# Patient Record
Sex: FEMALE | Race: WHITE | NOT HISPANIC OR LATINO | Employment: OTHER | URBAN - METROPOLITAN AREA
[De-identification: names, ages, dates, MRNs, and addresses within clinical notes are randomized per-mention and may not be internally consistent; named-entity substitution may affect disease eponyms.]

---

## 2018-02-20 ENCOUNTER — HOSPITAL ENCOUNTER (OUTPATIENT)
Dept: RADIOLOGY | Facility: MEDICAL CENTER | Age: 73
End: 2018-02-20

## 2018-06-26 ENCOUNTER — OFFICE VISIT (OUTPATIENT)
Dept: PHYSICAL MEDICINE AND REHAB | Facility: MEDICAL CENTER | Age: 73
End: 2018-06-26
Payer: MEDICARE

## 2018-06-26 VITALS
SYSTOLIC BLOOD PRESSURE: 124 MMHG | HEART RATE: 74 BPM | OXYGEN SATURATION: 94 % | DIASTOLIC BLOOD PRESSURE: 68 MMHG | WEIGHT: 109 LBS | HEIGHT: 64 IN | TEMPERATURE: 98.2 F | BODY MASS INDEX: 18.61 KG/M2

## 2018-06-26 DIAGNOSIS — M62.838 MUSCLE SPASM: ICD-10-CM

## 2018-06-26 DIAGNOSIS — G89.29 OTHER CHRONIC PAIN: ICD-10-CM

## 2018-06-26 DIAGNOSIS — M85.80 OSTEOPENIA, UNSPECIFIED LOCATION: ICD-10-CM

## 2018-06-26 DIAGNOSIS — Z98.1 S/P LUMBAR FUSION: ICD-10-CM

## 2018-06-26 PROCEDURE — 99205 OFFICE O/P NEW HI 60 MIN: CPT | Performed by: PHYSICAL MEDICINE & REHABILITATION

## 2018-06-26 RX ORDER — DULOXETIN HYDROCHLORIDE 60 MG/1
60 CAPSULE, DELAYED RELEASE ORAL DAILY
Qty: 30 CAP | Refills: 1 | Status: SHIPPED | OUTPATIENT
Start: 2018-06-26 | End: 2018-07-26

## 2018-06-26 RX ORDER — HYDROCODONE BITARTRATE AND ACETAMINOPHEN 5; 325 MG/1; MG/1
TABLET ORAL
COMMUNITY
Start: 2018-06-12 | End: 2018-08-01

## 2018-06-26 RX ORDER — PAROXETINE HYDROCHLORIDE 20 MG/1
TABLET, FILM COATED ORAL
COMMUNITY
Start: 2018-06-12 | End: 2018-06-26

## 2018-06-26 RX ORDER — DIAZEPAM 5 MG/1
2.5 TABLET ORAL
COMMUNITY
End: 2018-06-26

## 2018-06-26 RX ORDER — HYDROCODONE BITARTRATE AND ACETAMINOPHEN 10; 325 MG/1; MG/1
1 TABLET ORAL
COMMUNITY
End: 2018-06-26

## 2018-06-26 RX ORDER — HYDROCODONE BITARTRATE AND ACETAMINOPHEN 10; 325 MG/1; MG/1
1 TABLET ORAL
COMMUNITY
End: 2018-07-24

## 2018-06-26 RX ORDER — IBUPROFEN 200 MG
200 TABLET ORAL
COMMUNITY
End: 2018-06-26

## 2018-06-26 RX ORDER — CELECOXIB 200 MG/1
CAPSULE ORAL
COMMUNITY
Start: 2018-06-12 | End: 2018-07-24

## 2018-06-26 RX ORDER — DIAZEPAM 5 MG/1
TABLET ORAL
COMMUNITY
Start: 2016-04-05 | End: 2018-07-24

## 2018-06-26 RX ORDER — DULOXETIN HYDROCHLORIDE 30 MG/1
30 CAPSULE, DELAYED RELEASE ORAL DAILY
Qty: 7 CAP | Refills: 0 | Status: SHIPPED | OUTPATIENT
Start: 2018-06-26 | End: 2018-07-03

## 2018-06-26 RX ORDER — TAMSULOSIN HYDROCHLORIDE 0.4 MG/1
0.4 CAPSULE ORAL
COMMUNITY
Start: 2016-06-14 | End: 2018-06-26

## 2018-06-26 RX ORDER — DIAZEPAM 5 MG/1
TABLET ORAL
Refills: 0 | COMMUNITY
Start: 2018-04-04 | End: 2018-06-26

## 2018-06-26 RX ORDER — CELECOXIB 200 MG/1
CAPSULE ORAL
COMMUNITY
Start: 2018-05-18 | End: 2018-06-26

## 2018-06-26 RX ORDER — SENNOSIDES 8.6 MG
2 TABLET ORAL
COMMUNITY

## 2018-06-26 RX ORDER — DICLOFENAC SODIUM 75 MG/1
TABLET, DELAYED RELEASE ORAL
COMMUNITY
Start: 2018-06-12 | End: 2018-07-24

## 2018-06-26 ASSESSMENT — PAIN SCALES - GENERAL: PAINLEVEL: 7=MODERATE-SEVERE PAIN

## 2018-06-26 NOTE — PROGRESS NOTES
New patient note    Physiatry (physical medicine and  Rehabilitation), interventional spine and sports medicine    Date of Service: 6/26/2018    Chief complaint: Low back pain    HISTORY    HPI: Rupal Amaya 73 y.o. female who presents today for evaluation of low back pain that is sharp in the morning.  She has had three lumbar spine surgeries with end result of lumbar fusion from L2-S1.  She reports that she is able to to ride her bicycle and does this for about 30 minutes a day.  This is okay, but when she walks, she can only go a few hundred yards and this is pretty limiting given the previous active lifestyle that she was accustomed to leading.  Within the last few months, she had an epidural performed by Dr. Barker.  This was done in the office.    She does not report significant weakness, but she more the significant increase in pain with walking.  Her surgeries and condition have not been without complications with the surgery in 2014 .  About three years ago, she had cauda equina syndrome following resection of benign epidural soft tissue tumor.  She does not report issues with her bowel or bladder function currently.      Medical records review:  I reviewed the note from the referring provider Dr. Héctor Powers 06/12/2018  She takes norco for acute worsening of pain, typically is taking about 3-4 tablets in a week.  Dr. Powers reported that she had an epidural with Dr. Barker.  During her visit May 2018, she had suggested that she would like to stop her Paxil.  At the 06/12/2018 visit, they elected to restart this medication.    At her most recent evaluation with a [facility] 03/06/2018, they had suggested a trial of physical therapy.  If this was not successful, they had discussed possible L1-2 laminectomy and fusion for the severe spinal stenosis at the level above her most proximally fused level.      Previous treatments:    Physical Therapy: Yes     Medications the patient is tried: Norco,  Celebrex    Previous surgeries: Multiple surgeries with the most recent being 12/5/2017, an anterior lateral lumbar fusion with instrumentation and BMP with Dr. Ennis.    ROS:   Red Flags ROS:   Fever, Chills, Sweats: Denies  Involuntary Weight Loss: Denies  Bladder Incontinence: Denies  Bowel Incontinence: denies  Saddle Anesthesia: Denies    All other systems reviewed and negative.       PMHx:   History reviewed. No pertinent past medical history.    PSHx:   Past Surgical History:   Procedure Laterality Date   • LUMBAR FUSION ANTERIOR  2017    Dr. Tawana Ennis   • LUMBAR LAMINECTOMY DISKECTOMY  12/27/2014    Performed by Benjamin Carney M.D. at SURGERY [facility]   • LUMBAR FUSION POSTERIOR  12/27/2014    Performed by Benjamin Carney M.D. at SURGERY [facility]   • HARDWARE REMOVAL NEURO  12/27/2014    Performed by Benjamin Carney M.D. at SURGERY [facility]   • LUMBAR FUSION ANTERIOR  2006    Dr. Tawana Ennis   • LUMBAR FUSION POSTERIOR  2006    L4-5 Dr. Tawana Ennis       Family history   History reviewed. No pertinent family history.      Medications:   Current Outpatient Prescriptions   Medication   • celecoxib (CELEBREX) 200 MG Cap   • diazePAM (VALIUM) 5 MG Tab   • diclofenac EC (VOLTAREN) 75 MG Tablet Delayed Response   • HYDROcodone-acetaminophen (NORCO) 5-325 MG Tab per tablet   • Sennosides (SENNA) 8.6 MG Tab   • DULoxetine (CYMBALTA) 30 MG Cap DR Particles   • DULoxetine (CYMBALTA) 60 MG Cap DR Particles delayed-release capsule   • hydrocodone/acetaminophen (NORCO)  MG TABS   • HYDROcodone/acetaminophen (NORCO)  MG Tab     No current facility-administered medications for this visit.        Allergies:   No Known Allergies    Social Hx:   Social History     Social History   • Marital status:      Spouse name: N/A   • Number of children: N/A   • Years of education: N/A     Occupational History   • Not on file.     Social History Main Topics   • Smoking status: Never Smoker   • Smokeless  "tobacco: Never Used   • Alcohol use 1.2 oz/week     2 Glasses of wine per week      Comment: every night   • Drug use: No   • Sexual activity: Not on file     Other Topics Concern   •  Service No   • Blood Transfusions No   • Caffeine Concern No   • Occupational Exposure No   • Hobby Hazards Yes   • Sleep Concern No   • Stress Concern No   • Special Diet No   • Back Care No   • Exercise Yes   • Bike Helmet Yes   • Seat Belt Yes   • Self-Exams No     Social History Narrative   • No narrative on file         EXAMINATION     Physical Exam:   Vitals: Blood pressure 124/68, pulse 74, temperature 36.8 °C (98.2 °F), height 1.626 m (5' 4\"), weight 49.4 kg (109 lb), SpO2 94 %.    Constitutional:   Body Habitus: Body mass index is 18.71 kg/m².  Cooperation: Fully cooperates with exam  Appearance: Well-groomed, well-nourished, not disheveled, in no acute distress    Eyes: No scleral icterus to suggest severe liver disease, no proptosis to suggest severe hyperthyroid    ENT -no obvious auditory deficits, no facial droop     Skin -no rashes or lesions noted     Respiratory-  breathing comfortable on room air, no audible wheezing    Cardiovascular- capillary refills less than 2 seconds. No lower extremity edema is noted.     Gastrointestinal - no obvious abdominal masses, No tenderness to palpation in the abdomen    Psychiatric- alert and oriented ×3. Normal affect.     Gait - mildly antalgic gait, no use of ambulatory device.  Heel and toe walking are difficult, but she is able to perform     Musculoskeletal -   Cervical spine   Inspection: No deformities of the skin over the cervical spine. No rashes or lesions.    Functional A/P ROM in all directions, without  pain      Spurling’s sign: negative bilaterally    No signs of muscular atrophy in bilateral upper extremities     Thoracic/Lumbar Spine/Sacral Spine/Hips   Inspection: No obvious evidence of atrophy in bilateral lower extremities throughout     ROM: decreased " AROM with flexion, extension, lateral flexion, and rotation bilaterally, with pain     Palpation:   No tenderness to palpation in midline at T1-T12 levels. Tenderness to palpation in the left and right of the midline T1-L5.  There is note of muscle spasms bilaterally in the lumbar spine.  palpation over SI joint: mild PSIS tenderness bilaterally    palpation over buttock: negative bilaterally    palpation in hip or over the greater trochanters: negative bilaterally    Quadriceps contractures bilaterally    Lumbar spine Special tests  Neuro tension  Straight leg test negative bilaterally    Femoral stretch sign results in thigh discomfort.    HIP  Negative FAIR test bilaterally. Internal rotation and external rotation are symmetric.    SI joint tests  Observation patient sits on one buttocks: Negative   RICHARD test negative bilaterally       Neuro       Key points for the international standards for neurological classification of spinal cord injury (ISNCSCI) to light touch.     Dermatome R L   C4 2  2   C5 2 2   C6 1 2   C7 1 2   C8 2 2   T1 2 2   T2 2 2   L2 2 2   L3 2 2   L4 2 2   L5 2 2   S1 2 2   S2 2 2         Motor Exam Upper Extremities   ? Myotome R L   Shoulder flexion C5 5 5   Elbow flexion C5 5 5   Wrist extension C6 5 5   Elbow extension C7 5 5   Finger flexion C8 5 5   Finger abduction T1 5 5         Motor Exam Lower Extremities    ? Myotome R L   Hip flexion L2 5 5   Knee extension L3 5 5   Ankle dorsiflexion L4 5 5   Toe extension L5 5 5   Ankle plantarflexion S1 5 5           Noel’s sign negative bilaterally   Babinski sign negative bilaterally   Clonus of the ankle negative bilaterally     Reflexes  ?  R L   Biceps  2+  2+   Brachioradialis  2+ 2+   Patella  2+ 2+   Achilles   2+ 2+         MEDICAL DECISION MAKING    Medical records review: see under HPI section.     DATA    Labs:   Lab Results   Component Value Date/Time    SODIUM 136 01/01/2015 06:23 AM    POTASSIUM 3.6 01/01/2015 06:23 AM     CHLORIDE 106 01/01/2015 06:23 AM    CO2 24 01/01/2015 06:23 AM    ANION 6.0 01/01/2015 06:23 AM    GLUCOSE 102 (H) 01/01/2015 06:23 AM    BUN 8 01/01/2015 06:23 AM    CREATININE 0.50 01/01/2015 06:23 AM    CALCIUM 8.5 01/01/2015 06:23 AM    ASTSGOT 29 01/01/2015 06:23 AM    ALTSGPT 21 01/01/2015 06:23 AM    TBILIRUBIN 0.5 01/01/2015 06:23 AM    ALBUMIN 3.2 01/01/2015 06:23 AM    TOTPROTEIN 5.8 (L) 01/01/2015 06:23 AM    GLOBULIN 2.6 01/01/2015 06:23 AM    AGRATIO 1.2 01/01/2015 06:23 AM       Lab Results   Component Value Date/Time    PROTHROMBTM 13.3 12/27/2014 12:30 AM    INR 1.02 12/27/2014 12:30 AM           Imaging: I personally reviewed following images, these are my reads  MRI lumbar spine 02/13/2018: Reviewed with the patient at the time of the visit.  There is evidence of lumbar fusion from L2-5. Prosthetic disc at L4-5.  There is some scoliosis noted.  There is artifact.    IMAGING radiology reads. I reviewed the following radiology reads     DEXA 2004: Osteopenia                                         Results for orders placed during the hospital encounter of 12/26/14   MR-LUMBAR SPINE-W/O    Impression 1.  Persistent soft tissue structure within spinal canal from L5-S2 measuring approximately 4 x 2.2 x 1.0 cm. Considerations include a disc fragment, chordoma, or possible additional etiology. Contrast exam may prove useful for further differentiation.  2.  Posterior spinal fusion with pedicle screws at L4 and L5 with vertical rods. Prosthetic disc at L4-5 level.  3.  Posterior disc protrusions and hypertrophic ridging as well as ligamentous buckling and facet arthropathy contributing to spinal canal stenosis and foraminal stenosis at multiple levels.  4.  Overall findings grossly stable since the previous study performed 12/19/14.                                                                                                                Diagnosis   Visit Diagnoses     ICD-10-CM   1. S/P lumbar fusion  Z98.1   2. Other chronic pain G89.29   3. Muscle spasm M62.838   4. Osteopenia, unspecified location M85.80       ASSESSMENT:  Rupal Amaya 73 y.o. female who presents for evaluation of symptoms in her low back     Rupal was seen today for new patient.    Diagnoses and all orders for this visit:    S/P lumbar fusion  -     DULoxetine (CYMBALTA) 30 MG Cap DR Particles; Take 1 Cap by mouth every day for 7 days.  -     DULoxetine (CYMBALTA) 60 MG Cap DR Particles delayed-release capsule; Take 1 Cap by mouth every day for 30 days.    Other chronic pain  -     DULoxetine (CYMBALTA) 30 MG Cap DR Particles; Take 1 Cap by mouth every day for 7 days.  -     DULoxetine (CYMBALTA) 60 MG Cap DR Particles delayed-release capsule; Take 1 Cap by mouth every day for 30 days.    Muscle spasm  -     TENS UNIT    Osteopenia, unspecified location  -     VITAMIN D,25 HYDROXY; Future      Given her lumbar muscle spasms, trial of a TENS unit might provide relief and is worth a trial.    She is using opioids, but is not taking high doses or even daily.  This seems like a reasonable strategy to use the minimal effective amount to supplement her usual treatment.    We talked about a trial of duloxetine.  This would replace paxil, which she had just resumed.  We discussed that this could help with her chronic musculoskeletal pain and hopefully stiff help with mood.      Check vitamin D to follow-up on osteopenia noted on xray.  We discussed importance of bone health.    We discussed the extensive surgical treatments that she has had as well as the lumbar spinal stenosis at L1-2, that appears severe.  Given her extensive surgical treatment, injections under fluoroscopic guidance could be considered, but given anatomical changes, it is difficult to project what kind of response she might have.  We could also consider lumbar facet injections, however, as this might offer some relief given innervation patterns of the medial  branches.    Outside records requested:  The patient signed outside records request form for her outside records including outside images.      Follow-up:4-6 weeks        Please note that this dictation was created using voice recognition software. I have made every reasonable attempt to correct obvious errors but there may be errors of grammar and content that I may have overlooked prior to finalization of this note.      Joshua Navarro MD  Physical Medicine and Rehabilitation  Interventional Spine and Sports Physiatry  [facility]

## 2018-07-02 ENCOUNTER — HOSPITAL ENCOUNTER (OUTPATIENT)
Dept: RADIOLOGY | Facility: MEDICAL CENTER | Age: 73
End: 2018-07-02

## 2018-07-03 ENCOUNTER — HOSPITAL ENCOUNTER (OUTPATIENT)
Dept: RADIOLOGY | Facility: MEDICAL CENTER | Age: 73
End: 2018-07-03

## 2018-07-24 ENCOUNTER — OFFICE VISIT (OUTPATIENT)
Dept: PHYSICAL MEDICINE AND REHAB | Facility: MEDICAL CENTER | Age: 73
End: 2018-07-24
Payer: MEDICARE

## 2018-07-24 VITALS
DIASTOLIC BLOOD PRESSURE: 65 MMHG | HEIGHT: 65 IN | TEMPERATURE: 97.8 F | OXYGEN SATURATION: 92 % | WEIGHT: 108.25 LBS | BODY MASS INDEX: 18.03 KG/M2 | HEART RATE: 87 BPM | SYSTOLIC BLOOD PRESSURE: 110 MMHG

## 2018-07-24 DIAGNOSIS — M79.2 NEUROPATHIC PAIN: ICD-10-CM

## 2018-07-24 DIAGNOSIS — M62.838 MUSCLE SPASM: ICD-10-CM

## 2018-07-24 DIAGNOSIS — M47.816 LUMBAR SPONDYLOSIS: ICD-10-CM

## 2018-07-24 DIAGNOSIS — F11.90 CHRONIC, CONTINUOUS USE OF OPIOIDS: ICD-10-CM

## 2018-07-24 DIAGNOSIS — M54.5 BILATERAL LOW BACK PAIN, UNSPECIFIED CHRONICITY, WITH SCIATICA PRESENCE UNSPECIFIED: ICD-10-CM

## 2018-07-24 DIAGNOSIS — Z98.1 S/P LUMBAR FUSION: ICD-10-CM

## 2018-07-24 PROCEDURE — 99214 OFFICE O/P EST MOD 30 MIN: CPT | Performed by: PHYSICAL MEDICINE & REHABILITATION

## 2018-07-24 RX ORDER — GABAPENTIN 300 MG/1
300 CAPSULE ORAL 4 TIMES DAILY
Qty: 120 CAP | Refills: 1 | Status: SHIPPED | OUTPATIENT
Start: 2018-07-24 | End: 2018-08-01

## 2018-07-24 ASSESSMENT — PAIN SCALES - GENERAL: PAINLEVEL: 8=MODERATE-SEVERE PAIN

## 2018-07-24 NOTE — PATIENT INSTRUCTIONS
Start magnesium oxide 400mg at night.  Look for GMP or USP symbol     Get vitamin D and CMP done    Gabapentin  Start 300mg at bedtime for 3-5 days, then increase to 300mg at dinner and bedtime x3-5 days, then 300mg at lunch, dinner and bedtime x3-5d, then 300mg with breakfast, lunch, dinner and bedtime     If symptoms are significantly improved at lower dose, hold at that dose.  Call with questions.

## 2018-07-24 NOTE — PROGRESS NOTES
"Follow up patient note  Pain Medicine, Interventional spine and sports physiatry, Physical medicine rehabilitation      Chief complaint:   Low back and leg pain      HISTORY    Please see new patient note dated by Dr. Navarro,  for more details.     HPI  Patient identification: Rupal Amaya 73 y.o. female returns for follow-up of her low back pain.    Interval history: Rupal Amaya 73 y.o. female who presents today for evaluation of low back pain that is sharp in the morning.  She has had three lumbar spine surgeries with end result of lumbar fusion from L2-S1.  The pain is \"serious\" and is about an 8/10 and is impacting her life and mobility.  In describing the quality, her pain is \"burning\" in the low back and then aching.    Since the last visit, she continues to ride her bike for about 30 minutes a day. Currently, she is taking cymbalta.  This does seem to help somewhat.  No side effects.      TENS unit is somewhat helpful, while she is wearing it.    Right now, she is taking norco 5/325 in the morning.  This also helps a little bit, but does not provide relief through the day.    She is not currently taking anti-inflammatories.       ROS Red Flags :   Fever, Chills, Sweats: Denies  Involuntary Weight Loss: Denies  Bowel/Bladder Incontinence: Denies changes, she does have a history of cauda equina  Saddle Anesthesia: Denies      PMHx:   History reviewed. No pertinent past medical history.    PSHx:    Past Surgical History:   Procedure Laterality Date   • LUMBAR FUSION ANTERIOR  2017    Dr. Tawana Ennis   • LUMBAR LAMINECTOMY DISKECTOMY  12/27/2014    Performed by Benjamin Carney M.D. at SURGERY [facility]   • LUMBAR FUSION POSTERIOR  12/27/2014    Performed by Benjamin Carney M.D. at SURGERY [facility]   • HARDWARE REMOVAL NEURO  12/27/2014    Performed by Benjamin Carney M.D. at SURGERY [facility]   • LUMBAR FUSION ANTERIOR  2006    Dr. Tawana Ennis   • LUMBAR FUSION POSTERIOR  2006    L4-5 Dr." "Tawana Ennis       Family history    Denies neuromuscular disease  History reviewed. No pertinent family history.      Medications:    Current Outpatient Prescriptions   Medication   • magnesium oxide (MAG-OX) 400 (241.3 Mg) MG Tab tablet   • gabapentin (NEURONTIN) 300 MG Cap   • HYDROcodone-acetaminophen (NORCO) 5-325 MG Tab per tablet   • Sennosides (SENNA) 8.6 MG Tab   • DULoxetine (CYMBALTA) 60 MG Cap DR Particles delayed-release capsule     No current facility-administered medications for this visit.        Allergies:    No Known Allergies    Social Hx:    Social History     Social History   • Marital status:      Spouse name: N/A   • Number of children: N/A   • Years of education: N/A     Occupational History   • Not on file.     Social History Main Topics   • Smoking status: Never Smoker   • Smokeless tobacco: Never Used   • Alcohol use 1.2 oz/week     2 Glasses of wine per week      Comment: every night   • Drug use: No   • Sexual activity: Not on file     Other Topics Concern   •  Service No   • Blood Transfusions No   • Caffeine Concern No   • Occupational Exposure No   • Hobby Hazards Yes   • Sleep Concern No   • Stress Concern No   • Special Diet No   • Back Care No   • Exercise Yes   • Bike Helmet Yes   • Seat Belt Yes   • Self-Exams No     Social History Narrative   • No narrative on file         EXAMINATION     Physical Exam:   Vitals: Blood pressure 110/65, pulse 87, temperature 36.6 °C (97.8 °F), height 1.651 m (5' 5\"), weight 49.1 kg (108 lb 3.9 oz), SpO2 92 %.    Constitutional:   Body Habitus: Body mass index is 18.01 kg/m².  Cooperation: Fully cooperates with exam  Appearance: Well-groomed no disheveled, in no acute distress    Respiratory-  breathing comfortable on room air, no audible wheezing  Cardiovascular- capillary refills less than 2 seconds. No lower extremity edema is noted.   Psychiatric- alert and oriented ×3. Normal affect.   Spine:   Tenderness to palpation left and " right midline of L1-L5.   Seated SLR is negative bilaterally  Sensation is grossly intact in the lower extremities bilaterally  No focal motor deficits were noted in the lower extremities bilaterally  Reflexes are 2+ bilateral patella and achilles bilaterally        MEDICAL DECISION MAKING    DATA    Labs:  Lab Results   Component Value Date/Time    SODIUM 136 01/01/2015 06:23 AM    POTASSIUM 3.6 01/01/2015 06:23 AM    CHLORIDE 106 01/01/2015 06:23 AM    CO2 24 01/01/2015 06:23 AM    GLUCOSE 102 (H) 01/01/2015 06:23 AM    BUN 8 01/01/2015 06:23 AM    CREATININE 0.50 01/01/2015 06:23 AM        Lab Results   Component Value Date/Time    PROTHROMBTM 13.3 12/27/2014 12:30 AM    INR 1.02 12/27/2014 12:30 AM        Lab Results   Component Value Date/Time    WBC 8.1 01/01/2015 06:23 AM    RBC 3.07 (L) 01/01/2015 06:23 AM    HEMOGLOBIN 10.2 (L) 01/01/2015 06:23 AM    HEMATOCRIT 29.5 (L) 01/01/2015 06:23 AM    MCV 96.1 01/01/2015 06:23 AM    MCH 33.2 (H) 01/01/2015 06:23 AM    MCHC 34.6 01/01/2015 06:23 AM    MPV 9.6 01/01/2015 06:23 AM    NEUTSPOLYS 68.3 01/01/2015 06:23 AM    LYMPHOCYTES 17.8 (L) 01/01/2015 06:23 AM    MONOCYTES 8.9 01/01/2015 06:23 AM    EOSINOPHILS 4.2 01/01/2015 06:23 AM    BASOPHILS 0.4 01/01/2015 06:23 AM          Imaging: I personally reviewed images at previous visit.  MRI lumbar spine 02/13/2018: Reviewed with the patient at the time of the visit.  There is evidence of lumbar fusion from L2-5. Prosthetic disc at L4-5.  There is some scoliosis noted.  There is artifact.  I reviewed the following radiology reports                                                   DIAGNOSIS   Visit Diagnoses     ICD-10-CM   1. Lumbar spondylosis M47.816   2. Bilateral low back pain, unspecified chronicity, with sciatica presence unspecified M54.5   3. S/P lumbar fusion Z98.1   4. Chronic, continuous use of opioids F11.90   5. Muscle spasm M62.838   6. Neuropathic pain M79.2         ASSESSMENT and PLAN:     Rupal Bennett  Monique 73 y.o. female with chronic pain s/p lumbbar fusion from L2-5 with prosthetic disc at L4-5    Rupal was seen today for follow-up.    Diagnoses and all orders for this visit:    Lumbar spondylosis  -     COMP METABOLIC PANEL  -     REFERRAL TO PHYSIATRY (PMR)    Bilateral low back pain, unspecified chronicity, with sciatica presence unspecified  -     magnesium oxide (MAG-OX) 400 (241.3 Mg) MG Tab tablet; Take 1 Tab by mouth every day for 30 days.  -     REFERRAL TO PHYSIATRY (PMR)    S/P lumbar fusion  -     PAIN MANAGEMENT SCRN, UR  -     gabapentin (NEURONTIN) 300 MG Cap; Take 1 Cap by mouth 4 times a day for 30 days.    Chronic, continuous use of opioids  -     PAIN MANAGEMENT SCRN, UR    Muscle spasm  -     magnesium oxide (MAG-OX) 400 (241.3 Mg) MG Tab tablet; Take 1 Tab by mouth every day for 30 days.    Neuropathic pain  -     gabapentin (NEURONTIN) 300 MG Cap; Take 1 Cap by mouth 4 times a day for 30 days.      Discussed trial of gabapentin for features of neuropathic pain.  Discussed titrating this medication slowly.    Trial magnesium at bedtime.    UDS obtained to for consideration of management of chronic opioids.  She has had several surgeries and is not interested in more spine surgery.    Continue activity as tolerated.    Trial of lumbar facet blocks discussed.  We will trial L2-L5 bilaterally.  Risks, benefits and expectations discussed.  We will plan for a diagnostic and therapeutic L2-5 medial branch block trial.      Follow up: 2 weeks after the procedure    Thank you for allowing me to participate in the care of this patient. If you have any questions please not hesitate to contact me.    Total time: 30 minutes face-to-face time. I spent greater than 50% of the time for patient care and coordination on this date, including with the patient as per assessment and plan above.       Please note that this dictation was created using voice recognition software. I have made every reasonable  attempt to correct obvious errors but there may be errors of grammar and content that I may have overlooked prior to finalization of this note.      Joshua Navarro MD  Interventional Spine and Sports Physiatry  Physical Medicine and Rehabilitation  [facility]  7/24/2018 9:10 AM

## 2018-08-01 ENCOUNTER — HOSPITAL ENCOUNTER (OUTPATIENT)
Dept: PAIN MANAGEMENT | Facility: REHABILITATION | Age: 73
End: 2018-08-01
Attending: PHYSICAL MEDICINE & REHABILITATION
Payer: MEDICARE

## 2018-08-01 ENCOUNTER — HOSPITAL ENCOUNTER (OUTPATIENT)
Dept: RADIOLOGY | Facility: REHABILITATION | Age: 73
End: 2018-08-01
Attending: PHYSICAL MEDICINE & REHABILITATION
Payer: MEDICARE

## 2018-08-01 VITALS
SYSTOLIC BLOOD PRESSURE: 145 MMHG | HEIGHT: 65 IN | TEMPERATURE: 98.4 F | BODY MASS INDEX: 17.81 KG/M2 | WEIGHT: 106.92 LBS | HEART RATE: 69 BPM | DIASTOLIC BLOOD PRESSURE: 78 MMHG | RESPIRATION RATE: 16 BRPM | OXYGEN SATURATION: 97 %

## 2018-08-01 PROCEDURE — 64495 INJ PARAVERT F JNT L/S 3 LEV: CPT

## 2018-08-01 PROCEDURE — 700117 HCHG RX CONTRAST REV CODE 255

## 2018-08-01 PROCEDURE — 700111 HCHG RX REV CODE 636 W/ 250 OVERRIDE (IP)

## 2018-08-01 PROCEDURE — 64494 INJ PARAVERT F JNT L/S 2 LEV: CPT

## 2018-08-01 PROCEDURE — 700101 HCHG RX REV CODE 250

## 2018-08-01 PROCEDURE — 64493 INJ PARAVERT F JNT L/S 1 LEV: CPT

## 2018-08-01 RX ORDER — METHYLPREDNISOLONE ACETATE 40 MG/ML
INJECTION, SUSPENSION INTRA-ARTICULAR; INTRALESIONAL; INTRAMUSCULAR; SOFT TISSUE
Status: COMPLETED
Start: 2018-08-01 | End: 2018-08-01

## 2018-08-01 RX ORDER — LIDOCAINE HYDROCHLORIDE 10 MG/ML
INJECTION, SOLUTION EPIDURAL; INFILTRATION; INTRACAUDAL; PERINEURAL
Status: COMPLETED
Start: 2018-08-01 | End: 2018-08-01

## 2018-08-01 RX ORDER — BUPIVACAINE HYDROCHLORIDE 5 MG/ML
INJECTION, SOLUTION EPIDURAL; INTRACAUDAL
Status: COMPLETED
Start: 2018-08-01 | End: 2018-08-01

## 2018-08-01 RX ORDER — LIDOCAINE HYDROCHLORIDE 20 MG/ML
INJECTION, SOLUTION EPIDURAL; INFILTRATION; INTRACAUDAL; PERINEURAL
Status: COMPLETED
Start: 2018-08-01 | End: 2018-08-01

## 2018-08-01 RX ADMIN — METHYLPREDNISOLONE ACETATE 80 MG: 40 INJECTION, SUSPENSION INTRA-ARTICULAR; INTRALESIONAL; INTRAMUSCULAR; SOFT TISSUE at 08:11

## 2018-08-01 RX ADMIN — LIDOCAINE HYDROCHLORIDE 6 ML: 20 INJECTION, SOLUTION EPIDURAL; INFILTRATION; INTRACAUDAL; PERINEURAL at 08:11

## 2018-08-01 RX ADMIN — IOHEXOL 3 ML: 240 INJECTION, SOLUTION INTRATHECAL; INTRAVASCULAR; INTRAVENOUS; ORAL at 08:10

## 2018-08-01 ASSESSMENT — PAIN SCALES - GENERAL
PAINLEVEL_OUTOF10: 6
PAINLEVEL_OUTOF10: 3
PAINLEVEL_OUTOF10: 3

## 2018-08-01 NOTE — PROGRESS NOTES
Medication reconciliation reviewed with patient. Denied taking any blood thinners and any  any anti- inflammatories medications. Patient had a   Ritika / spouse .Home care form and verbal  instruction given to patient and verbalized understanding. Dr. Navarro made aware & spoke to the patient . Hand off reported to DICK Hodge RN.

## 2018-08-01 NOTE — PROGRESS NOTES
.Fluids tolerated well. Ice compress  offered patient refused Reviewed home care instructions and understood by patient.

## 2018-08-01 NOTE — PROCEDURES
Date of Service: 8/1/2018    Physician/s:Joshua Navarro MD    Pre-operative Diagnosis: Lumbosacral spondylosis, bilateral low back pain without sciatica    Post-operative Diagnosis: Lumbosacral spondylosis, bilateral low back pain without sciatica    Procedure: Medial Branch Blocks targeting the right and left lumbar two through lumbar five medial branch blocks bilaterally     Description of procedure:    The risks, benefits, and alternatives of the procedure were reviewed and discussed with the patient.  Written informed consent was freely obtained. A pre-procedural time-out was conducted by the physician verifying patient’s identity, procedure to be performed, procedure site and side, and allergy verification. Appropriate equipment was determined to be in place for the procedure.        Right lumbar two through lumbar five medial branch blocks and right lumbar five dorsal ramus blocks:    A 25g 3.5 inch needle was then placed at the intersection of the transverse process and superior articular process at the right lumbar two medial branch location.  The needle tips were then verified by AP and lateral views, less than 1 cc of contrast dye was used to highlight the medial branch while the fluoroscope was running live. Following negative aspiration, approx 1.0cc of solution containing 1.0 cc depomedrol (40mg) and 3.0cc of 2% lidocaine was then injected at the above levels, and the needles were removed intact.    A 25g 3.5 inch needle was then placed at the intersection of the transverse process and superior articular process at the right lumbar three medial branch location.  The needle tips were then verified by AP and lateral views, less than 1 cc of contrast dye was used to highlight the medial branch while the fluoroscope was running live. Following negative aspiration, approx 1.0cc of solution containing 1.0cc depomedrol (40mg) and 3.0cc of 2% lidocaine was then injected at the above levels, and the needles  were removed intact.    A 25g 3.5 inch needle was then placed at the intersection of the transverse process and superior articular process at the right lumbar four medial branch location.  The needle tips were then verified by AP and lateral views, less than 1 cc of contrast dye was used to highlight the medial branch while the fluoroscope was running live. Following negative aspiration, approx 1.0cc of solution containing 1.0cc depomedrol (40mg) and 3.0cc of 2% lidocaine was then injected at the above levels, and the needles were removed intact.    A 25g 3.5 inch needle was then placed at the intersection of the transverse process and superior articular process at the right lumbar five dorsal ramus nerve  The needle tips were then verified by oblique view, less than 1 cc of contrast dye was used to highlight the medial branch while the fluoroscope was running live. Following negative aspiration, approx 1.0cc of solution containing 1.0cc depomedrol (40mg) and 3.0cc of 2% lidocaine was then injected at the above levels, and the needles were removed intact.     Left lumbar two through lumbar five medial branch blocks and left lumbar five dorsal ramus branch blocks:    A 25g 3.5 inch needle was then placed at the intersection of the transverse process and superior articular process at the left lumbar two medial branch location.  The needle tips were then verified by AP and lateral views, less than 1 cc of contrast dye was used to highlight the medial branch while the fluoroscope was running live. Following negative aspiration, approx 1.0cc of solution containing 1.0 cc depomedrol (40mg) and 3.0cc of 2% lidocaine was then injected at the above levels, and the needles were removed intact.    A 25g 3.5 inch needle was then placed at the intersection of the transverse process and superior articular process at the left lumbar three medial branch location.  The needle tips were then verified by AP and lateral views , less  than 1 cc of contrast dye was used to highlight the medial branch while the fluoroscope was running live. Following negative aspiration, approx 1.0cc of solution containing 1.0cc depomedrol (40mg) and 3.0cc of 2% lidocaine was then injected at the above levels, and the needles were removed intact.    A 25g 3.5 inch needle was then placed at the intersection of the transverse process and superior articular process at the left lumbar four medial branch location.  The needle tips were then verified by AP and lateral views, less than 1 cc of contrast dye was used to highlight the medial branch while the fluoroscope was running live. Following negative aspiration, approx 1.0cc of solution containing 1.0cc depomedrol (40mg) and 3.0cc of 2% lidocaine was then injected at the above levels, and the needles were removed intact.    A 25g 3.5 inch needle was then placed at the intersection of the transverse process and superior articular process at the left lumbar five dorsal ramus nerve  The needle tips were then verified by AP and lateral views, less than 1 cc of contrast dye was used to highlight the medial branch while the fluoroscope was running live. Following negative aspiration, approx 1.0cc of solution containing 1.0cc depomedrol (40mg) and 3.0cc of 2% lidocaine was then injected at the above levels, and the needles were removed intact.      The patient's back was covered with a 4x4 gauze, the area was cleansed with sterile normal saline, and a dressing was applied.      There were no complications noted, the patient was hemodynamically stable, and tolerated the procedure well.   She reported decreased pain and we will send her out with a sheet to track symptoms today.  She was discharged in good condition.    Joshua Navarro MD  Physical Medicine and Rehabilitation  Interventional Spine and Sports Physiatry  [facility]  8/1/2018  10:34 AM

## 2018-08-01 NOTE — PROGRESS NOTES
Patient positioned pre-procedure by RN,CNA, and xray tech. Pillow placed under lower legs and feet for support.

## 2018-08-14 ENCOUNTER — OFFICE VISIT (OUTPATIENT)
Dept: PHYSICAL MEDICINE AND REHAB | Facility: MEDICAL CENTER | Age: 73
End: 2018-08-14
Payer: MEDICARE

## 2018-08-14 VITALS
WEIGHT: 107.36 LBS | SYSTOLIC BLOOD PRESSURE: 110 MMHG | DIASTOLIC BLOOD PRESSURE: 70 MMHG | HEART RATE: 86 BPM | TEMPERATURE: 98.8 F | BODY MASS INDEX: 17.87 KG/M2 | OXYGEN SATURATION: 95 %

## 2018-08-14 DIAGNOSIS — M79.2 NEUROPATHIC PAIN: ICD-10-CM

## 2018-08-14 DIAGNOSIS — Z98.1 S/P LUMBAR FUSION: ICD-10-CM

## 2018-08-14 DIAGNOSIS — G89.29 OTHER CHRONIC PAIN: ICD-10-CM

## 2018-08-14 DIAGNOSIS — F11.90 CHRONIC, CONTINUOUS USE OF OPIOIDS: ICD-10-CM

## 2018-08-14 DIAGNOSIS — M54.5 BILATERAL LOW BACK PAIN, UNSPECIFIED CHRONICITY, WITH SCIATICA PRESENCE UNSPECIFIED: ICD-10-CM

## 2018-08-14 PROCEDURE — 99214 OFFICE O/P EST MOD 30 MIN: CPT | Performed by: PHYSICAL MEDICINE & REHABILITATION

## 2018-08-14 RX ORDER — DULOXETIN HYDROCHLORIDE 60 MG/1
CAPSULE, DELAYED RELEASE ORAL
COMMUNITY
Start: 2018-07-31 | End: 2018-08-14 | Stop reason: SDUPTHER

## 2018-08-14 RX ORDER — GABAPENTIN 300 MG/1
300 CAPSULE ORAL 4 TIMES DAILY
Qty: 120 CAP | Refills: 2 | Status: SHIPPED | OUTPATIENT
Start: 2018-08-14 | End: 2018-09-07 | Stop reason: SDUPTHER

## 2018-08-14 RX ORDER — DULOXETIN HYDROCHLORIDE 60 MG/1
60 CAPSULE, DELAYED RELEASE ORAL DAILY
Qty: 30 CAP | Refills: 5 | Status: SHIPPED | OUTPATIENT
Start: 2018-08-14 | End: 2018-09-13

## 2018-08-14 RX ORDER — BUPRENORPHINE 5 UG/H
1 PATCH TRANSDERMAL
Qty: 4 PATCH | Refills: 0 | Status: SHIPPED | OUTPATIENT
Start: 2018-08-14 | End: 2018-09-07

## 2018-08-14 ASSESSMENT — PAIN SCALES - GENERAL: PAINLEVEL: 8=MODERATE-SEVERE PAIN

## 2018-08-14 NOTE — PROGRESS NOTES
Follow up patient note  Pain Medicine, Interventional spine and sports physiatry, Physical medicine rehabilitation      Chief complaint:   Low back and leg pain      HISTORY    Please see new patient note dated by Dr. Navarro,  for more details.     HPI  Patient identification: Rupal Amaya 73 y.o. female returns for follow-up of her low back pain.    Interval history: Rupal Amaya 73 y.o. female who presents today for evaluation of low back pain that is sharp in the morning.  She has had three lumbar spine surgeries with end result of lumbar fusion from L2-S1.  She returns after L2-5 bilateral diagnostic medial branch blocks.  She does not feel like it improved her pain on the day of the procedure.  There was some post-procedure tenderness, but her usual pain was not improved.    Her pain continues to be mostly axial, but she does have some feeling of decreased sensation in her posterior gluteal region that reminds her of the symptoms prior to diagnosis of cauda equina.    She has been taking cymbalta, but ran out of it 5 days ago.  No side effects were reported.  She has been taking gabapentin 300mg four times a day without side effects.      Yesterday, she was able to go for a walk for almost two miles and did some mushroom hunting.  This is not something that she usually feels like she can do.  Today, her pain is a bit worse.  Walking typically aggravates her symptoms in her low back.    She continues to ride her bike for about 30 minutes a day.     Right now, she is taking norco 5/325 in the morning.  While this helps in the morning, it does not give lasting relief.    She is not currently taking anti-inflammatories.         ROS Red Flags :   Fever, Chills, Sweats: Denies  Involuntary Weight Loss: Denies  Bowel/Bladder Incontinence: Denies changes, she does have a history of cauda equina  Saddle Anesthesia: Denies      PMHx:   History reviewed. No pertinent past medical history.    PSHx:    Past  Surgical History:   Procedure Laterality Date   • LUMBAR FUSION ANTERIOR  2017    Dr. Tawana Ennis   • LUMBAR LAMINECTOMY DISKECTOMY  12/27/2014    Performed by Benjamin Carney M.D. at SURGERY [facility]   • LUMBAR FUSION POSTERIOR  12/27/2014    Performed by Benjamin Carney M.D. at SURGERY [facility]   • HARDWARE REMOVAL NEURO  12/27/2014    Performed by Benjamin Carney M.D. at SURGERY [facility]   • LUMBAR FUSION ANTERIOR  2006    Dr. Tawana Ennis   • LUMBAR FUSION POSTERIOR  2006    L4-5 Dr. Tawana Ennis       Family history    Denies neuromuscular disease  History reviewed. No pertinent family history.      Medications:    Current Outpatient Prescriptions   Medication   • gabapentin (NEURONTIN) 300 MG Cap   • DULoxetine (CYMBALTA) 60 MG Cap DR Particles delayed-release capsule   • Buprenorphine 5 MCG/HR PATCH WEEKLY   • Sennosides (SENNA) 8.6 MG Tab     No current facility-administered medications for this visit.        Allergies:    No Known Allergies    Social Hx:    Social History     Social History   • Marital status:      Spouse name: N/A   • Number of children: N/A   • Years of education: N/A     Occupational History   • Not on file.     Social History Main Topics   • Smoking status: Never Smoker   • Smokeless tobacco: Never Used   • Alcohol use 1.2 oz/week     2 Glasses of wine per week      Comment: every night   • Drug use: No   • Sexual activity: Not on file     Other Topics Concern   •  Service No   • Blood Transfusions No   • Caffeine Concern No   • Occupational Exposure No   • Hobby Hazards Yes   • Sleep Concern No   • Stress Concern No   • Special Diet No   • Back Care No   • Exercise Yes   • Bike Helmet Yes   • Seat Belt Yes   • Self-Exams No     Social History Narrative   • No narrative on file         EXAMINATION     Physical Exam:   Vitals: Blood pressure 110/70, pulse 86, temperature 37.1 °C (98.8 °F), weight 48.7 kg (107 lb 5.8 oz), SpO2 95 %.    Constitutional:   Body  Habitus: Body mass index is 17.87 kg/m².  Cooperation: Fully cooperates with exam  Appearance: Well-groomed no disheveled, in no acute distress    Respiratory-  breathing comfortable on room air, no audible wheezing  Cardiovascular- capillary refills less than 2 seconds. No lower extremity edema is noted.   Psychiatric- alert and oriented ×3. Normal affect.       MEDICAL DECISION MAKING    DATA    Labs: UDS reviewed.  Positive for benzodiazepines and trace alcohol      Lab Results   Component Value Date/Time    SODIUM 136 01/01/2015 06:23 AM    POTASSIUM 3.6 01/01/2015 06:23 AM    CHLORIDE 106 01/01/2015 06:23 AM    CO2 24 01/01/2015 06:23 AM    GLUCOSE 102 (H) 01/01/2015 06:23 AM    BUN 8 01/01/2015 06:23 AM    CREATININE 0.50 01/01/2015 06:23 AM        Lab Results   Component Value Date/Time    PROTHROMBTM 13.3 12/27/2014 12:30 AM    INR 1.02 12/27/2014 12:30 AM        Lab Results   Component Value Date/Time    WBC 8.1 01/01/2015 06:23 AM    RBC 3.07 (L) 01/01/2015 06:23 AM    HEMOGLOBIN 10.2 (L) 01/01/2015 06:23 AM    HEMATOCRIT 29.5 (L) 01/01/2015 06:23 AM    MCV 96.1 01/01/2015 06:23 AM    MCH 33.2 (H) 01/01/2015 06:23 AM    MCHC 34.6 01/01/2015 06:23 AM    MPV 9.6 01/01/2015 06:23 AM    NEUTSPOLYS 68.3 01/01/2015 06:23 AM    LYMPHOCYTES 17.8 (L) 01/01/2015 06:23 AM    MONOCYTES 8.9 01/01/2015 06:23 AM    EOSINOPHILS 4.2 01/01/2015 06:23 AM    BASOPHILS 0.4 01/01/2015 06:23 AM          Imaging: I personally reviewed images at previous visit.  MRI lumbar spine 02/13/2018: Reviewed with the patient at the time of the visit.  There is evidence of lumbar fusion from L2-5. Prosthetic disc at L4-5.  There is some scoliosis noted.  There is artifact.  I reviewed the following radiology reports                                                   DIAGNOSIS   Visit Diagnoses     ICD-10-CM   1. S/P lumbar fusion Z98.1   2. Chronic, continuous use of opioids F11.90   3. Neuropathic pain M79.2   4. Other chronic pain G89.29    5. Bilateral low back pain, unspecified chronicity, with sciatica presence unspecified M54.5         ASSESSMENT and PLAN:     Rupal Amaya 73 y.o. female with chronic pain s/p lumbbar fusion from L2-5 with prosthetic disc at L4-5    Rupal was seen today for follow-up.    Diagnoses and all orders for this visit:    S/P lumbar fusion  -     gabapentin (NEURONTIN) 300 MG Cap; Take 1 Cap by mouth 4 times a day for 30 days.  -     DULoxetine (CYMBALTA) 60 MG Cap DR Particles delayed-release capsule; Take 1 Cap by mouth every day for 30 days.  -     Buprenorphine 5 MCG/HR PATCH WEEKLY; Apply 1 Patch to skin as directed every 7 days for 28 days.  -     CONSENT FOR OPIATE PRESCRIPTION    Chronic, continuous use of opioids    Neuropathic pain  -     gabapentin (NEURONTIN) 300 MG Cap; Take 1 Cap by mouth 4 times a day for 30 days.  -     CONSENT FOR OPIATE PRESCRIPTION    Other chronic pain  -     gabapentin (NEURONTIN) 300 MG Cap; Take 1 Cap by mouth 4 times a day for 30 days.  -     DULoxetine (CYMBALTA) 60 MG Cap DR Particles delayed-release capsule; Take 1 Cap by mouth every day for 30 days.  -     Buprenorphine 5 MCG/HR PATCH WEEKLY; Apply 1 Patch to skin as directed every 7 days for 28 days.    Bilateral low back pain, unspecified chronicity, with sciatica presence unspecified      Discussed continuing trial of gabapentin 300mg po qid.     We discussed possible trial of buprenorphine at a low dose for more consistent pain control.  Plan to repeat UDS at the next visit.    Risks, benefits and expectations of use of opioids discussed at the time of the visit.  Consent obtained.  Low risk based on  of 0, but will monitor for plan for deprescribing benzos from other sources.    Continue activity as tolerated.  Discussed working on starting with low number of minutes for exercise and advance gradually.  Goal of increasing walking tolerance with this consistency.      Follow up: 4 weeks    Thank you for allowing me  to participate in the care of this patient. If you have any questions please not hesitate to contact me.    Total time: 30 minutes face-to-face time. I spent greater than 50% of the time for patient care and coordination on this date, including with the patient as per assessment and plan above.       Please note that this dictation was created using voice recognition software. I have made every reasonable attempt to correct obvious errors but there may be errors of grammar and content that I may have overlooked prior to finalization of this note.      Joshua Navarro MD  Interventional Spine and Sports Physiatry  Physical Medicine and Rehabilitation  [facility]

## 2018-09-07 ENCOUNTER — OFFICE VISIT (OUTPATIENT)
Dept: PHYSICAL MEDICINE AND REHAB | Facility: MEDICAL CENTER | Age: 73
End: 2018-09-07
Payer: MEDICARE

## 2018-09-07 VITALS
BODY MASS INDEX: 18.37 KG/M2 | TEMPERATURE: 98.1 F | DIASTOLIC BLOOD PRESSURE: 82 MMHG | OXYGEN SATURATION: 94 % | HEIGHT: 64 IN | WEIGHT: 107.58 LBS | SYSTOLIC BLOOD PRESSURE: 132 MMHG | HEART RATE: 81 BPM

## 2018-09-07 DIAGNOSIS — G89.29 OTHER CHRONIC PAIN: ICD-10-CM

## 2018-09-07 DIAGNOSIS — Z98.1 S/P LUMBAR FUSION: ICD-10-CM

## 2018-09-07 DIAGNOSIS — M79.2 NEUROPATHIC PAIN: ICD-10-CM

## 2018-09-07 DIAGNOSIS — M54.5 BILATERAL LOW BACK PAIN, UNSPECIFIED CHRONICITY, WITH SCIATICA PRESENCE UNSPECIFIED: ICD-10-CM

## 2018-09-07 PROCEDURE — 99214 OFFICE O/P EST MOD 30 MIN: CPT | Performed by: PHYSICAL MEDICINE & REHABILITATION

## 2018-09-07 RX ORDER — DULOXETIN HYDROCHLORIDE 60 MG/1
60 CAPSULE, DELAYED RELEASE ORAL DAILY
Qty: 30 CAP | Refills: 0 | Status: SHIPPED | OUTPATIENT
Start: 2018-09-07 | End: 2018-10-07

## 2018-09-07 RX ORDER — GABAPENTIN 300 MG/1
300 CAPSULE ORAL 4 TIMES DAILY
Qty: 120 CAP | Refills: 0 | Status: SHIPPED | OUTPATIENT
Start: 2018-09-07 | End: 2018-10-07

## 2018-09-07 RX ORDER — HYDROCODONE BITARTRATE AND ACETAMINOPHEN 5; 325 MG/1; MG/1
1-2 TABLET ORAL EVERY 4 HOURS PRN
Qty: 30 TAB | Refills: 0 | Status: SHIPPED | OUTPATIENT
Start: 2018-09-07 | End: 2018-10-07

## 2018-09-07 ASSESSMENT — PAIN SCALES - GENERAL: PAINLEVEL: NO PAIN

## 2018-09-07 NOTE — PROGRESS NOTES
Follow up patient note  Pain Medicine, Interventional spine and sports physiatry, Physical medicine rehabilitation      Chief complaint:   Low back and leg pain      HISTORY    Please see new patient note dated by Dr. Navarro,  for more details.     HPI  Patient identification: Rupal Amaya 73 y.o. female returns for follow-up of her low back pain.    Interval history: Rupal Amaya 73 y.o. female who presents today for evaluation of low back pain that is sharp in the morning.  She has had three lumbar spine surgeries with end result of lumbar fusion from L2-S1.      She states that she was able to go on a 2 mile hike and this worked well. She did this twice.  She continues to ride her bike for about 30 minutes a day.     She thinks that the cymbalta and gabapentin have been helping.  She has been taking gabapentin 300mg four times a day without side effects.      Right now, she is taking norco 5/325 in the morning.  While this helps in the morning, it does not give lasting relief.  We discussed that she has not had any trouble with the butrans patch, but the cost was about $186/month.  No skin issues, rotating sites.  It does seem like this has been somewhat helpful.    She is using sennakot and has mirelax and is think that she might use this, too.  Daily bowel movements, but not as regular.    She is not currently taking anti-inflammatories.         ROS Red Flags :   Fever, Chills, Sweats: Denies  Involuntary Weight Loss: Denies  Bowel/Bladder Incontinence: Denies changes, she does have a history of cauda equina  Saddle Anesthesia: Denies      PMHx:   History reviewed. No pertinent past medical history.    PSHx:    Past Surgical History:   Procedure Laterality Date   • LUMBAR FUSION ANTERIOR  2017    Dr. Tawana Ennis   • LUMBAR LAMINECTOMY DISKECTOMY  12/27/2014    Performed by Benjamin Carney M.D. at SURGERY [facility]   • LUMBAR FUSION POSTERIOR  12/27/2014    Performed by Benjamin Carney M.D. at  "SURGERY [facility]   • HARDWARE REMOVAL NEURO  12/27/2014    Performed by Benjamin Carney M.D. at SURGERY [facility]   • LUMBAR FUSION ANTERIOR  2006    Dr. Tawana Ennis   • LUMBAR FUSION POSTERIOR  2006    L4-5 Dr. Tawana Ennis       Family history    Denies neuromuscular disease  History reviewed. No pertinent family history.      Medications:    Current Outpatient Prescriptions   Medication   • DULoxetine (CYMBALTA) 60 MG Cap DR Particles delayed-release capsule   • gabapentin (NEURONTIN) 300 MG Cap   • HYDROcodone-acetaminophen (NORCO) 5-325 MG Tab per tablet   • DULoxetine (CYMBALTA) 60 MG Cap DR Particles delayed-release capsule   • Sennosides (SENNA) 8.6 MG Tab     No current facility-administered medications for this visit.        Allergies:    No Known Allergies    Social Hx:    Social History     Social History   • Marital status:      Spouse name: N/A   • Number of children: N/A   • Years of education: N/A     Occupational History   • Not on file.     Social History Main Topics   • Smoking status: Never Smoker   • Smokeless tobacco: Never Used   • Alcohol use 1.2 oz/week     2 Glasses of wine per week      Comment: every night   • Drug use: No   • Sexual activity: Not on file     Other Topics Concern   •  Service No   • Blood Transfusions No   • Caffeine Concern No   • Occupational Exposure No   • Hobby Hazards Yes   • Sleep Concern No   • Stress Concern No   • Special Diet No   • Back Care No   • Exercise Yes   • Bike Helmet Yes   • Seat Belt Yes   • Self-Exams No     Social History Narrative   • No narrative on file         EXAMINATION     Physical Exam:   Vitals: Blood pressure 132/82, pulse 81, temperature 36.7 °C (98.1 °F), height 1.626 m (5' 4\"), weight 48.8 kg (107 lb 9.4 oz), SpO2 94 %.    Constitutional:   Body Habitus: Body mass index is 18.47 kg/m².  Cooperation: Fully cooperates with exam  Appearance: Well-groomed no disheveled, in no acute distress    Respiratory-  breathing " comfortable on room air, no audible wheezing  Cardiovascular- capillary refills less than 2 seconds. No lower extremity edema is noted.   Psychiatric- alert and oriented ×3. Normal affect.       MEDICAL DECISION MAKING    DATA    Labs: UDS reviewed.  Positive for benzodiazepines and trace alcohol  Discussed with patient.      Lab Results   Component Value Date/Time    SODIUM 136 01/01/2015 06:23 AM    POTASSIUM 3.6 01/01/2015 06:23 AM    CHLORIDE 106 01/01/2015 06:23 AM    CO2 24 01/01/2015 06:23 AM    GLUCOSE 102 (H) 01/01/2015 06:23 AM    BUN 8 01/01/2015 06:23 AM    CREATININE 0.50 01/01/2015 06:23 AM        Lab Results   Component Value Date/Time    PROTHROMBTM 13.3 12/27/2014 12:30 AM    INR 1.02 12/27/2014 12:30 AM        Lab Results   Component Value Date/Time    WBC 8.1 01/01/2015 06:23 AM    RBC 3.07 (L) 01/01/2015 06:23 AM    HEMOGLOBIN 10.2 (L) 01/01/2015 06:23 AM    HEMATOCRIT 29.5 (L) 01/01/2015 06:23 AM    MCV 96.1 01/01/2015 06:23 AM    MCH 33.2 (H) 01/01/2015 06:23 AM    MCHC 34.6 01/01/2015 06:23 AM    MPV 9.6 01/01/2015 06:23 AM    NEUTSPOLYS 68.3 01/01/2015 06:23 AM    LYMPHOCYTES 17.8 (L) 01/01/2015 06:23 AM    MONOCYTES 8.9 01/01/2015 06:23 AM    EOSINOPHILS 4.2 01/01/2015 06:23 AM    BASOPHILS 0.4 01/01/2015 06:23 AM          Imaging: I personally reviewed images at previous visit.  MRI lumbar spine 02/13/2018: Reviewed with the patient at the time of the visit.  There is evidence of lumbar fusion from L2-5. Prosthetic disc at L4-5.  There is some scoliosis noted.  There is artifact.  I reviewed the following radiology reports                                                   DIAGNOSIS   Visit Diagnoses     ICD-10-CM   1. S/P lumbar fusion Z98.1   2. Bilateral low back pain, unspecified chronicity, with sciatica presence unspecified M54.5   3. Neuropathic pain M79.2   4. Other chronic pain G89.29         ASSESSMENT and PLAN:     Rupal Amaya 73 y.o. female with chronic pain s/p lumbbar  fusion from L2-5 with prosthetic disc at L4-5    Rupal was seen today for follow-up.    Diagnoses and all orders for this visit:    S/P lumbar fusion  -     DULoxetine (CYMBALTA) 60 MG Cap DR Particles delayed-release capsule; Take 1 Cap by mouth every day for 30 days.  -     gabapentin (NEURONTIN) 300 MG Cap; Take 1 Cap by mouth 4 times a day for 30 days.  -     HYDROcodone-acetaminophen (NORCO) 5-325 MG Tab per tablet; Take 1-2 Tabs by mouth every four hours as needed for up to 30 days.  -     [facility]NAG[facility] SCREEN; Future      Bilateral low back pain, unspecified chronicity, with sciatica presence unspecified  -     DULoxetine (CYMBALTA) 60 MG Cap DR Particles delayed-release capsule; Take 1 Cap by mouth every day for 30 days.  -     HYDROcodone-acetaminophen (NORCO) 5-325 MG Tab per tablet; Take 1-2 Tabs by mouth every four hours as needed for up to 30 days.  -     [facility]NAG[facility] SCREEN; Future    Neuropathic pain  -     gabapentin (NEURONTIN) 300 MG Cap; Take 1 Cap by mouth 4 times a day for 30 days.    Other chronic pain  -     gabapentin (NEURONTIN) 300 MG Cap; Take 1 Cap by mouth 4 times a day for 30 days.      Continue gabapentin 300mg po qid.  Continue cymbalta.    We discussed possible trial of buprenorphine at a low dose for more consistent pain control.  This seems that it has been helpful, but the cost is significant.      Risks, benefits and expectations of use of opioids discussed at the time of the visit.  Consent obtained.  Low risk based on  of 0, but will monitor for plan for deprescribing benzos from other sources.  Discussed repeat UDS today.  She does report that she has been prescribed benzos, but does not report regular use.  Discussed concerns about this combination.    Discussed concern about using alcohol with opioids.  We discussed that I will write norco for 30 days.  #30.  Plan to wean as tolerated and continue cymbalta and gabapentin.      Follow up: 4  chase    Thank you for allowing me to participate in the care of this patient. If you have any questions please not hesitate to contact me.    Total time: 30 minutes face-to-face time. I spent greater than 50% of the time for patient care and coordination on this date, including with the patient as per assessment and plan above.       Please note that this dictation was created using voice recognition software. I have made every reasonable attempt to correct obvious errors but there may be errors of grammar and content that I may have overlooked prior to finalization of this note.      Joshua Navarro MD  Interventional Spine and Sports Physiatry  Physical Medicine and Rehabilitation  [facility]

## 2018-10-02 ENCOUNTER — OFFICE VISIT (OUTPATIENT)
Dept: PHYSICAL MEDICINE AND REHAB | Facility: MEDICAL CENTER | Age: 73
End: 2018-10-02
Payer: MEDICARE

## 2018-10-02 VITALS
HEART RATE: 84 BPM | TEMPERATURE: 98.4 F | HEIGHT: 64 IN | DIASTOLIC BLOOD PRESSURE: 68 MMHG | SYSTOLIC BLOOD PRESSURE: 120 MMHG | BODY MASS INDEX: 18.86 KG/M2 | WEIGHT: 110.45 LBS | OXYGEN SATURATION: 92 %

## 2018-10-02 DIAGNOSIS — M79.2 NEUROPATHIC PAIN: ICD-10-CM

## 2018-10-02 DIAGNOSIS — Z98.1 S/P LUMBAR FUSION: ICD-10-CM

## 2018-10-02 DIAGNOSIS — G89.29 OTHER CHRONIC PAIN: ICD-10-CM

## 2018-10-02 PROCEDURE — 99214 OFFICE O/P EST MOD 30 MIN: CPT | Performed by: PHYSICAL MEDICINE & REHABILITATION

## 2018-10-02 ASSESSMENT — PAIN SCALES - GENERAL: PAINLEVEL: 8=MODERATE-SEVERE PAIN

## 2018-10-02 NOTE — PROGRESS NOTES
Follow up patient note  Pain Medicine, Interventional spine and sports physiatry, Physical medicine rehabilitation      Chief complaint:   Low back and leg pain      HISTORY    Please see new patient note dated by Dr. Navarro,  for more details.     HPI  Patient identification: Rupal Amaya 73 y.o. female returns for follow-up of her low back pain.    Interval history: Rupal Amaya 73 y.o. female who presents today for evaluation of low back pain that is sharp in the morning.  She has had three lumbar spine surgeries with end result of lumbar fusion from L2-S1    Walking/hiking long distances is difficult for her.  This is a usual activity in the past.  She continues to ride her bike for about 30 minutes a day.  Hiking is difficult for her.  She does report that she has had some worsening of symptoms without pain medications.    It sounds like she has been pushing herself quite a lot, axial back pain increases with gardening activities.      She thinks that the cymbalta and gabapentin have been helping.  She has been taking gabapentin 300mg four times a day without side effects.         ROS Red Flags :   Fever, Chills, Sweats: Denies  Involuntary Weight Loss: Denies  Bowel/Bladder Incontinence: Denies changes, she does have a history of cauda equina  Saddle Anesthesia: Denies      PMHx:   History reviewed. No pertinent past medical history.    PSHx:    Past Surgical History:   Procedure Laterality Date   • LUMBAR FUSION ANTERIOR  2017    Dr. Tawana Ennis   • LUMBAR LAMINECTOMY DISKECTOMY  12/27/2014    Performed by Benjamin Carney M.D. at [facility]   • LUMBAR FUSION POSTERIOR  12/27/2014    Performed by Benjamin Carney M.D. at [facility]   • HARDWARE REMOVAL NEURO  12/27/2014    Performed by Benjamin Carney M.D. at [facility]   • LUMBAR FUSION ANTERIOR  2006    Dr. Tawana Ennis   • LUMBAR FUSION POSTERIOR  2006    L4-5 Dr. Tawana Ennis       Family history    Denies  "neuromuscular disease  History reviewed. No pertinent family history.      Medications:    Current Outpatient Prescriptions   Medication   • DULoxetine (CYMBALTA) 60 MG Cap DR Particles delayed-release capsule   • gabapentin (NEURONTIN) 300 MG Cap   • Sennosides (SENNA) 8.6 MG Tab   • HYDROcodone-acetaminophen (NORCO) 5-325 MG Tab per tablet     No current facility-administered medications for this visit.        Allergies:    No Known Allergies    Social Hx:    Social History     Social History   • Marital status:      Spouse name: N/A   • Number of children: N/A   • Years of education: N/A     Occupational History   • Not on file.     Social History Main Topics   • Smoking status: Never Smoker   • Smokeless tobacco: Never Used   • Alcohol use 1.2 oz/week     2 Glasses of wine per week      Comment: every night   • Drug use: No   • Sexual activity: Not on file     Other Topics Concern   •  Service No   • Blood Transfusions No   • Caffeine Concern No   • Occupational Exposure No   • Hobby Hazards Yes   • Sleep Concern No   • Stress Concern No   • Special Diet No   • Back Care No   • Exercise Yes   • Bike Helmet Yes   • Seat Belt Yes   • Self-Exams No     Social History Narrative   • No narrative on file         EXAMINATION     Physical Exam:   Vitals: Blood pressure 120/68, pulse 84, temperature 36.9 °C (98.4 °F), temperature source Temporal, height 1.626 m (5' 4\"), weight 50.1 kg (110 lb 7.2 oz), SpO2 92 %, not currently breastfeeding.    Constitutional:   Body Habitus: Body mass index is 18.96 kg/m².  Cooperation: Fully cooperates with exam  Appearance: Well-groomed no disheveled, in no acute distress    Respiratory-  breathing comfortable on room air, no audible wheezing  Cardiovascular- capillary refills less than 2 seconds. No lower extremity edema is noted.   Psychiatric- alert and oriented ×3. Normal affect.       MEDICAL DECISION MAKING    DATA    Labs: UDS reviewed. 09/15/2018 Hydrocodone " metabolites and ethyl glucuronide and ethyl sulfate.      Lab Results   Component Value Date/Time    SODIUM 136 01/01/2015 06:23 AM    POTASSIUM 3.6 01/01/2015 06:23 AM    CHLORIDE 106 01/01/2015 06:23 AM    CO2 24 01/01/2015 06:23 AM    GLUCOSE 102 (H) 01/01/2015 06:23 AM    BUN 8 01/01/2015 06:23 AM    CREATININE 0.50 01/01/2015 06:23 AM        Lab Results   Component Value Date/Time    PROTHROMBTM 13.3 12/27/2014 12:30 AM    INR 1.02 12/27/2014 12:30 AM        Lab Results   Component Value Date/Time    WBC 8.1 01/01/2015 06:23 AM    RBC 3.07 (L) 01/01/2015 06:23 AM    HEMOGLOBIN 10.2 (L) 01/01/2015 06:23 AM    HEMATOCRIT 29.5 (L) 01/01/2015 06:23 AM    MCV 96.1 01/01/2015 06:23 AM    MCH 33.2 (H) 01/01/2015 06:23 AM    MCHC 34.6 01/01/2015 06:23 AM    MPV 9.6 01/01/2015 06:23 AM    NEUTSPOLYS 68.3 01/01/2015 06:23 AM    LYMPHOCYTES 17.8 (L) 01/01/2015 06:23 AM    MONOCYTES 8.9 01/01/2015 06:23 AM    EOSINOPHILS 4.2 01/01/2015 06:23 AM    BASOPHILS 0.4 01/01/2015 06:23 AM          Imaging: I personally reviewed images at previous visit.  MRI lumbar spine 02/13/2018: Reviewed with the patient at the time of the visit.  There is evidence of lumbar fusion from L2-5. Prosthetic disc at L4-5.  There is some scoliosis noted.  There is artifact.  I reviewed the following radiology reports                                                   DIAGNOSIS   Visit Diagnoses     ICD-10-CM   1. S/P lumbar fusion Z98.1   2. Neuropathic pain M79.2   3. Other chronic pain G89.29   4. Chronic, continuous use of opioids F11.90         ASSESSMENT and PLAN:     Rupal Sampsongett 73 y.o. female with chronic pain s/p lumbbar fusion from L2-5 with prosthetic disc at L4-5    Rupal was seen today for follow-up.    Diagnoses and all orders for this visit:    S/P lumbar fusion    Bilateral low back pain, unspecified chronicity, with sciatica presence unspecified    Neuropathic pain    Other chronic pain    Continue gabapentin 300mg po qid.   Continue cymbalta.  She should have refills.    We discussed possible trial of buprenorphine at a low dose for more consistent pain control was more successful than she had realized and she was able to be more active in hiking and ways she wants.      We did discuss that she regularly drinks red wine.  This is a contraindication to long-term opioid use.  I have asked that she contemplate this choice.  UDS given to check in 3 weeks, should she determine that she wants to use opioids and stop drinking wine.      Additionally, we discussed possible spinal cord stimulator trial as an option.  Website information given.  Continue other medications and consider other treatment at follow-up.     Encouraged activity as tolerated.      Follow up: 6 weeks    Thank you for allowing me to participate in the care of this patient. If you have any questions please not hesitate to contact me.    Total time: 33 minutes face-to-face time. I spent greater than 50% of the time for patient care and coordination on this date, including with the patient as per assessment and plan above.       Please note that this dictation was created using voice recognition software. I have made every reasonable attempt to correct obvious errors but there may be errors of grammar and content that I may have overlooked prior to finalization of this note.      Joshua Navarro MD  Interventional Spine and Sports Physiatry  Physical Medicine and Rehabilitation  [facility]

## 2018-10-02 NOTE — PATIENT INSTRUCTIONS
Mavin Spinal cord stimulator    [email or web address]-systems-[email or web address]-scs-systems-[email or web address]

## 2018-11-08 ENCOUNTER — OFFICE VISIT (OUTPATIENT)
Dept: PHYSICAL MEDICINE AND REHAB | Facility: MEDICAL CENTER | Age: 73
End: 2018-11-08
Payer: MEDICARE

## 2018-11-08 VITALS
HEIGHT: 65 IN | HEART RATE: 79 BPM | TEMPERATURE: 97.4 F | SYSTOLIC BLOOD PRESSURE: 122 MMHG | BODY MASS INDEX: 18.15 KG/M2 | DIASTOLIC BLOOD PRESSURE: 70 MMHG | OXYGEN SATURATION: 93 % | WEIGHT: 108.91 LBS

## 2018-11-08 DIAGNOSIS — M62.838 MUSCLE SPASM: ICD-10-CM

## 2018-11-08 DIAGNOSIS — M47.816 LUMBAR SPONDYLOSIS: ICD-10-CM

## 2018-11-08 DIAGNOSIS — M79.2 NEUROPATHIC PAIN: ICD-10-CM

## 2018-11-08 DIAGNOSIS — F11.90 CHRONIC, CONTINUOUS USE OF OPIOIDS: ICD-10-CM

## 2018-11-08 DIAGNOSIS — M48.061 SPINAL STENOSIS OF LUMBAR REGION WITHOUT NEUROGENIC CLAUDICATION: ICD-10-CM

## 2018-11-08 DIAGNOSIS — Z98.1 S/P LUMBAR FUSION: ICD-10-CM

## 2018-11-08 DIAGNOSIS — M54.5 BILATERAL LOW BACK PAIN, UNSPECIFIED CHRONICITY, WITH SCIATICA PRESENCE UNSPECIFIED: ICD-10-CM

## 2018-11-08 PROCEDURE — 99214 OFFICE O/P EST MOD 30 MIN: CPT | Performed by: PHYSICAL MEDICINE & REHABILITATION

## 2018-11-08 RX ORDER — BUPRENORPHINE 7.5 UG/H
1 PATCH TRANSDERMAL
Qty: 4 PATCH | Refills: 0 | Status: SHIPPED | OUTPATIENT
Start: 2018-11-08 | End: 2018-12-06

## 2018-11-08 RX ORDER — PAROXETINE HYDROCHLORIDE 20 MG/1
TABLET, FILM COATED ORAL
COMMUNITY
Start: 2018-10-30

## 2018-11-08 RX ORDER — HYDROCODONE BITARTRATE AND ACETAMINOPHEN 5; 325 MG/1; MG/1
TABLET ORAL
Refills: 0 | COMMUNITY
Start: 2018-10-30

## 2018-11-08 ASSESSMENT — PAIN SCALES - GENERAL: PAINLEVEL: 8=MODERATE-SEVERE PAIN

## 2018-11-08 NOTE — PROGRESS NOTES
Follow up patient note  Pain Medicine, Interventional spine and sports physiatry, Physical medicine rehabilitation      Chief complaint:   Low back and leg pain      HISTORY    Please see new patient note dated by Dr. Navarro,  for more details.     HPI  Patient identification: Rupal Amaya 73 y.o. female returns for follow-up of her low back pain.    Interval history: Rupal Amaya 73 y.o. female who presents today for evaluation of low back pain that is sharp in the morning.  She has had three lumbar spine surgeries with end result of lumbar fusion from L2-S1    Since the last visit, she ran out of cymbalta and although I wrote for 5 refills, thought she did not have refills.  She did start paxil as written by her PCP, Dr. Héctor Camp at [facility] [phone].      She reports that she is feeling depressed about her pain and this is starting to limit her activities.      Her usual activities, including hiking and yardwork have become difficult.  When she overdoes it, she has a significant increase in her axial back pain and pain can increase after she has over-exerted.  Her wife accompanies her today and confirms this.  Walking results in significant axial back pain, but she does not report radicular symptoms.   No weakness in her legs.  No numbness and tingling.      She continues to ride her bike for about 30 minutes a day.  Hiking is difficult for her.      She does report that she has had some worsening of symptoms without pain medications.  While she has not entirely abstained from alcohol, she has decreased this significantly.  She feels that she can discontinue her alcohol intake as she did have improvement with use of butrans patch.    She is no longer taking gabapentin either.    She does not want to have more spine surgery.  We have discussed SCS previously, she does not want to do this either.     TENS was not helpful.    ROS   Unchanged from previous visit.    Red Flags :   Fever,  Chills, Sweats: Denies  Involuntary Weight Loss: Denies  Bowel/Bladder Incontinence: Denies changes, she does have a history of cauda equina  Saddle Anesthesia: Denies      PMHx:   History reviewed. No pertinent past medical history.    PSHx:    Past Surgical History:   Procedure Laterality Date   • LUMBAR FUSION ANTERIOR  2017    Dr. Tawana Ennis   • LUMBAR LAMINECTOMY DISKECTOMY  12/27/2014    Performed by Benjamin Carney M.D. at SUR[facility]ERY [facility]   • LUMBAR FUSION POSTERIOR  12/27/2014    Performed by Benjamin Carney M.D. at SUR[facility]ERY [facility]   • HARDWARE REMOVAL NEURO  12/27/2014    Performed by Benjamin Carney M.D. at SUR[facility]ERY [facility]   • LUMBAR FUSION ANTERIOR  2006    Dr. Tawana Ennis   • LUMBAR FUSION POSTERIOR  2006    L4-5 Dr. Tawana Ennis       Family history    Denies neuromuscular disease  History reviewed. No pertinent family history.      Medications:    Current Outpatient Prescriptions   Medication   • PARoxetine (PAXIL) 20 MG Tab   • HYDROcodone-acetaminophen (NORCO) 5-325 MG Tab per tablet   • Buprenorphine 7.5 MCG/HR PATCH WEEKLY   • Sennosides (SENNA) 8.6 MG Tab     No current facility-administered medications for this visit.        Allergies:    No Known Allergies    Social Hx:    Social History     Social History   • Marital status:      Spouse name: N/A   • Number of children: N/A   • Years of education: N/A     Occupational History   • Not on file.     Social History Main Topics   • Smoking status: Never Smoker   • Smokeless tobacco: Never Used   • Alcohol use 1.2 oz/week     2 Glasses of wine per week      Comment: every night   • Drug use: No   • Sexual activity: Not on file     Other Topics Concern   •  Service No   • Blood Transfusions No   • Caffeine Concern No   • Occupational Exposure No   • Hobby Hazards Yes   • Sleep Concern No   • Stress Concern No   • Special Diet No   • Back Care No   • Exercise Yes   • Bike Helmet Yes   • Seat Belt Yes   • Self-Exams No  "    Social History Narrative   • No narrative on file         EXAMINATION     Physical Exam:   Vitals: Blood pressure 122/70, pulse 79, temperature 36.3 °C (97.4 °F), temperature source Tympanic, height 1.651 m (5' 5\"), weight 49.4 kg (108 lb 14.5 oz), SpO2 93 %, not currently breastfeeding.    Constitutional:   Body Habitus: Body mass index is 18.12 kg/m².  Cooperation: Fully cooperates with exam  Appearance: Well-groomed no disheveled, in no acute distress    Respiratory-  breathing comfortable on room air, no audible wheezing  Cardiovascular- capillary refills less than 2 seconds. No lower extremity edema is noted.   Psychiatric- alert and oriented ×3. Normal affect.   Musculoskeletal- No focal motor deficits in the lower extremities bilaterally. Reflexes are 2+ patella and achilles bilaterally.  Sensation is intact in the lower extremities bilaterally      MEDICAL DECISION MAKING    DATA    Labs: No new labs to review    UDS reviewed. 09/15/2018 Hydrocodone metabolites and ethyl glucuronide and ethyl sulfate.      Lab Results   Component Value Date/Time    SODIUM 136 01/01/2015 06:23 AM    POTASSIUM 3.6 01/01/2015 06:23 AM    CHLORIDE 106 01/01/2015 06:23 AM    CO2 24 01/01/2015 06:23 AM    GLUCOSE 102 (H) 01/01/2015 06:23 AM    BUN 8 01/01/2015 06:23 AM    CREATININE 0.50 01/01/2015 06:23 AM        Lab Results   Component Value Date/Time    PROTHROMBTM 13.3 12/27/2014 12:30 AM    INR 1.02 12/27/2014 12:30 AM        Lab Results   Component Value Date/Time    WBC 8.1 01/01/2015 06:23 AM    RBC 3.07 (L) 01/01/2015 06:23 AM    HEMOGLOBIN 10.2 (L) 01/01/2015 06:23 AM    HEMATOCRIT 29.5 (L) 01/01/2015 06:23 AM    MCV 96.1 01/01/2015 06:23 AM    MCH 33.2 (H) 01/01/2015 06:23 AM    MCHC 34.6 01/01/2015 06:23 AM    MPV 9.6 01/01/2015 06:23 AM    NEUTSPOLYS 68.3 01/01/2015 06:23 AM    LYMPHOCYTES 17.8 (L) 01/01/2015 06:23 AM    MONOCYTES 8.9 01/01/2015 06:23 AM    EOSINOPHILS 4.2 01/01/2015 06:23 AM    BASOPHILS 0.4 " 01/01/2015 06:23 AM          Imaging: I reviewed radiology reports 02/13/2018 [facility].  MRI lumbar spine 02/13/2018:    Impression:   Severe canal stenosis at L1-2. Posterior lumbar fusion from L2-5. Prosthetic disc at L4-5.  There is some scoliosis noted.  There is artifact. 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 Foraminal stenosis at right L1.  Multilevel facet arthropathy.  Compression of left L4 nerve root.                                                   DIAGNOSIS   Visit Diagnoses     ICD-10-CM   1. S/P lumbar fusion Z98.1   2. Neuropathic pain M79.2   3. Spinal stenosis of lumbar region without neurogenic claudication M48.061   4. Chronic, continuous use of opioids F11.90   5. Muscle spasm M62.838   6. Bilateral low back pain, unspecified chronicity, with sciatica presence unspecified M54.5   7. Lumbar spondylosis M47.816         ASSESSMENT and PLAN:     Rupal Amaya 73 y.o. female with chronic pain s/p lumbbar fusion from L2-5 with prosthetic disc at L4-5 and spinal stenosis at L1-2, lumbar spondylosis    Rupal was seen today for follow-up.    It is unclear what happened with the cymbalta and also the gabapentin.  She is now taking paxil.      We discussed possible trial of buprenorphine at a low dose for more consistent pain control was more successful than she had realized and she was able to be more active in hiking and ways she wants.      She reports that she feels that she is able to discontinue alcohol use and the butrans patch was helpful.  Advised that I understand that she might have some alcohol on her UDS.  Discussed that she will stop this.  Also, that she will seek help if needed, to help with this.      She is not interested in spinal cord stimulator or more surgery.  We also discussed that she did not have good response to lumbar spondylosis.  There may be some improvement with epidural steroid injections, although it is not clear given the multilevel laminectomy and fusion at  L1-2.    Encouraged activity as tolerated, but pacing also recommended.    Plan to have release of records/communication on file to discuss case with Dr. Héctor Camp.    Follow up: 4 weeks    Thank you for allowing me to participate in the care of this patient. If you have any questions please not hesitate to contact me.    Please note that this dictation was created using voice recognition software. I have made every reasonable attempt to correct obvious errors but there may be errors of grammar and content that I may have overlooked prior to finalization of this note.      Joshua Navarro MD  Interventional Spine and Sports Physiatry  Physical Medicine and Rehabilitation  [facility]